# Patient Record
Sex: MALE | Race: WHITE | NOT HISPANIC OR LATINO | URBAN - METROPOLITAN AREA
[De-identification: names, ages, dates, MRNs, and addresses within clinical notes are randomized per-mention and may not be internally consistent; named-entity substitution may affect disease eponyms.]

---

## 2020-06-09 ENCOUNTER — OFFICE VISIT (OUTPATIENT)
Dept: URBAN - METROPOLITAN AREA CLINIC 77 | Facility: CLINIC | Age: 25
End: 2020-06-09
Payer: COMMERCIAL

## 2020-06-09 DIAGNOSIS — K50.80 CROHN'S DISEASE OF BOTH SMALL AND LARGE INTESTINE: ICD-10-CM

## 2020-06-09 DIAGNOSIS — K50.80 CROHN'S COLITIS: ICD-10-CM

## 2020-06-09 PROCEDURE — 96413 CHEMO IV INFUSION 1 HR: CPT | Performed by: INTERNAL MEDICINE

## 2020-06-09 RX ORDER — HYOSCYAMINE SULFATE 0.12 MG/1
TAKE 1 TABLET (0.125 MG) BY ORAL ROUTE EVERY 4 HOURS AS NEEDED FOR ABDOMINAL CRAMPS TABLET ORAL
Qty: 60 | Refills: 2 | Status: ACTIVE | COMMUNITY
Start: 2020-04-28 | End: 2020-07-27

## 2020-06-09 RX ORDER — USTEKINUMAB 90 MG/ML
INJECT 1 MILLILITER (90 MG) BY SUBCUTANEOUS ROUTE EVERY 8 WEEKS FOR 60 DAYS INJECTION, SOLUTION SUBCUTANEOUS
Qty: 1 | Refills: 6 | Status: ACTIVE | COMMUNITY
Start: 2020-05-26 | End: 2021-07-20

## 2020-08-04 ENCOUNTER — WEB ENCOUNTER (OUTPATIENT)
Dept: URBAN - METROPOLITAN AREA CLINIC 23 | Facility: CLINIC | Age: 25
End: 2020-08-04

## 2020-08-04 RX ORDER — USTEKINUMAB 90 MG/ML
INJECT 1 MILLILITER (90 MG) BY SUBCUTANEOUS ROUTE EVERY 6 WEEKS FOR 60 DAYS INJECTION, SOLUTION SUBCUTANEOUS
Qty: 2 | Refills: 6 | OUTPATIENT
Start: 2020-05-26 | End: 2021-07-20

## 2020-08-05 ENCOUNTER — WEB ENCOUNTER (OUTPATIENT)
Dept: URBAN - METROPOLITAN AREA CLINIC 23 | Facility: CLINIC | Age: 25
End: 2020-08-05

## 2020-08-20 ENCOUNTER — WEB ENCOUNTER (OUTPATIENT)
Dept: URBAN - METROPOLITAN AREA CLINIC 23 | Facility: CLINIC | Age: 25
End: 2020-08-20

## 2020-08-20 RX ORDER — CYANOCOBALAMIN 1000 UG/ML
1 ML INJECTION INTRAMUSCULAR; SUBCUTANEOUS
Qty: 0 | OUTPATIENT
Start: 2020-08-20 | End: 2020-09-19

## 2020-09-15 ENCOUNTER — OFFICE VISIT (OUTPATIENT)
Dept: URBAN - METROPOLITAN AREA CLINIC 23 | Facility: CLINIC | Age: 25
End: 2020-09-15
Payer: COMMERCIAL

## 2020-09-15 DIAGNOSIS — K50.00 ILEITIS, TERMINAL: ICD-10-CM

## 2020-09-15 DIAGNOSIS — K50.90 CROHN DISEASE: ICD-10-CM

## 2020-09-15 PROCEDURE — G9903 PT SCRN TBCO ID AS NON USER: HCPCS | Performed by: INTERNAL MEDICINE

## 2020-09-15 PROCEDURE — G8427 DOCREV CUR MEDS BY ELIG CLIN: HCPCS | Performed by: INTERNAL MEDICINE

## 2020-09-15 PROCEDURE — 99213 OFFICE O/P EST LOW 20 MIN: CPT | Performed by: INTERNAL MEDICINE

## 2020-09-15 PROCEDURE — G8420 CALC BMI NORM PARAMETERS: HCPCS | Performed by: INTERNAL MEDICINE

## 2020-09-15 RX ORDER — USTEKINUMAB 90 MG/ML
INJECT 1 MILLILITER (90 MG) BY SUBCUTANEOUS ROUTE EVERY 6 WEEKS FOR 60 DAYS INJECTION, SOLUTION SUBCUTANEOUS
Qty: 2 | Refills: 6 | Status: ACTIVE | COMMUNITY
Start: 2020-05-26 | End: 2021-07-20

## 2020-09-15 RX ORDER — CYANOCOBALAMIN 1000 UG/ML
1 ML INJECTION INTRAMUSCULAR; SUBCUTANEOUS
Qty: 0 | Status: ACTIVE | COMMUNITY
Start: 2020-08-20 | End: 2020-09-19

## 2020-09-15 NOTE — HPI-TODAY'S VISIT:
This year-old male presented for 2 month follow up after he started Stelara for newly diagnosed Crohn's disease. He had chronic abdominal pain diarrhea CT in May 2020 revealed terminal ileum inflammation colonoscopy confirmed diagnosis of Crohn disease, he was started on Stelara and fusion in June 2020 and he has 2 injections since then. He feels better his diarrhea and abdominal pain has improved

## 2020-09-15 NOTE — PHYSICAL EXAM CHEST:
no lesions,  no deformities,  no traumatic injuries,  no significant scars are present,  chest wall non-tender,  no masses present, breathing is unlabored without accessory muscle use, normal breath sounds DISPLAY PLAN FREE TEXT

## 2020-12-15 ENCOUNTER — WEB ENCOUNTER (OUTPATIENT)
Dept: URBAN - METROPOLITAN AREA CLINIC 23 | Facility: CLINIC | Age: 25
End: 2020-12-15

## 2020-12-15 ENCOUNTER — OFFICE VISIT (OUTPATIENT)
Dept: URBAN - METROPOLITAN AREA CLINIC 23 | Facility: CLINIC | Age: 25
End: 2020-12-15
Payer: COMMERCIAL

## 2020-12-15 DIAGNOSIS — K50.00 ILEITIS, TERMINAL: ICD-10-CM

## 2020-12-15 DIAGNOSIS — K50.90 CROHN DISEASE: ICD-10-CM

## 2020-12-15 PROCEDURE — G8427 DOCREV CUR MEDS BY ELIG CLIN: HCPCS | Performed by: INTERNAL MEDICINE

## 2020-12-15 PROCEDURE — G8482 FLU IMMUNIZE ORDER/ADMIN: HCPCS | Performed by: INTERNAL MEDICINE

## 2020-12-15 PROCEDURE — G9903 PT SCRN TBCO ID AS NON USER: HCPCS | Performed by: INTERNAL MEDICINE

## 2020-12-15 PROCEDURE — G8420 CALC BMI NORM PARAMETERS: HCPCS | Performed by: INTERNAL MEDICINE

## 2020-12-15 PROCEDURE — 99213 OFFICE O/P EST LOW 20 MIN: CPT | Performed by: INTERNAL MEDICINE

## 2020-12-15 RX ORDER — USTEKINUMAB 90 MG/ML
INJECT 1 MILLILITER (90 MG) BY SUBCUTANEOUS ROUTE EVERY 6 WEEKS FOR 60 DAYS INJECTION, SOLUTION SUBCUTANEOUS
Qty: 2 | Refills: 6 | Status: ACTIVE | COMMUNITY
Start: 2020-05-26 | End: 2021-07-20

## 2020-12-15 NOTE — HPI-TODAY'S VISIT:
This year-old male presented for 3 month follow up . he feels since last visit , he has 2 bowel movement a day , he is on stelra every 6 weeks  for  Crohn's disease. last visit I asked him to increased the frequency to every 6 weeks , He had chronic abdominal pain diarrhea CT in May 2020 revealed terminal ileum inflammation colonoscopy confirmed diagnosis of Crohn disease, his first infusion was in  June 2020 .  Since he increased the frequency of Stelara to every 6 weeks he feels much better last visit reported his pain and diarrhea worse during  last week before his injection, I increased the frequency to every 6 weeks and since then he feels much better

## 2021-06-15 ENCOUNTER — OFFICE VISIT (OUTPATIENT)
Dept: URBAN - METROPOLITAN AREA CLINIC 23 | Facility: CLINIC | Age: 26
End: 2021-06-15

## 2021-07-16 ENCOUNTER — TELEPHONE ENCOUNTER (OUTPATIENT)
Dept: URBAN - METROPOLITAN AREA CLINIC 23 | Facility: CLINIC | Age: 26
End: 2021-07-16

## 2021-07-16 RX ORDER — USTEKINUMAB 90 MG/ML
INJECT 1 MILLILITER (90 MG) BY SUBCUTANEOUS ROUTE EVERY 6 WEEKS FOR 60 DAYS INJECTION, SOLUTION SUBCUTANEOUS
Qty: 2 | Refills: 6
Start: 2020-05-26 | End: 2022-09-11

## 2021-08-27 ENCOUNTER — OFFICE VISIT (OUTPATIENT)
Dept: URBAN - METROPOLITAN AREA CLINIC 23 | Facility: CLINIC | Age: 26
End: 2021-08-27
Payer: COMMERCIAL

## 2021-08-27 DIAGNOSIS — K50.00 ILEITIS, TERMINAL: ICD-10-CM

## 2021-08-27 DIAGNOSIS — K50.90 CROHN DISEASE: ICD-10-CM

## 2021-08-27 PROCEDURE — 99214 OFFICE O/P EST MOD 30 MIN: CPT | Performed by: INTERNAL MEDICINE

## 2021-08-27 RX ORDER — USTEKINUMAB 90 MG/ML
INJECT 1 MILLILITER (90 MG) BY SUBCUTANEOUS ROUTE EVERY 6 WEEKS FOR 60 DAYS INJECTION, SOLUTION SUBCUTANEOUS
Qty: 2 | Refills: 6 | Status: ACTIVE | COMMUNITY
Start: 2020-05-26 | End: 2022-09-11

## 2021-08-27 NOTE — HPI-TODAY'S VISIT:
26 year-old male with history of small bowel Crohn's disease presented for 6 month follow up . His Stelara was changed to every 6 weeks since then his symptoms much better no abdominal pain no diarrhea, he has 2 bowel movement a day.   He had chronic abdominal pain diarrhea CT in May 2020 revealed terminal ileum inflammation colonoscopy confirmed diagnosis of Crohn disease, his first infusion was in  June 2020 .  Since he increased the frequency of Stelara to every 6 weeks he feels much better

## 2022-01-25 ENCOUNTER — TELEPHONE ENCOUNTER (OUTPATIENT)
Dept: URBAN - METROPOLITAN AREA CLINIC 23 | Facility: CLINIC | Age: 27
End: 2022-01-25

## 2022-01-25 RX ORDER — USTEKINUMAB 90 MG/ML
INJECT 1 PEN INJECTION, SOLUTION SUBCUTANEOUS
Qty: 2 PEN NEEDLE | Refills: 4
Start: 2022-01-25 | End: 2022-08-23

## 2022-02-18 ENCOUNTER — TELEPHONE ENCOUNTER (OUTPATIENT)
Dept: URBAN - METROPOLITAN AREA CLINIC 23 | Facility: CLINIC | Age: 27
End: 2022-02-18

## 2022-02-18 RX ORDER — USTEKINUMAB 90 MG/ML
ONE INJECTION INJECTION, SOLUTION SUBCUTANEOUS
Qty: 1 | Refills: 6 | OUTPATIENT
Start: 2022-02-20 | End: 2023-04-16

## 2022-02-22 ENCOUNTER — TELEPHONE ENCOUNTER (OUTPATIENT)
Dept: URBAN - METROPOLITAN AREA CLINIC 23 | Facility: CLINIC | Age: 27
End: 2022-02-22

## 2022-02-22 ENCOUNTER — OFFICE VISIT (OUTPATIENT)
Dept: URBAN - METROPOLITAN AREA CLINIC 23 | Facility: CLINIC | Age: 27
End: 2022-02-22

## 2022-02-22 RX ORDER — USTEKINUMAB 90 MG/ML
ONE INJECTION INJECTION, SOLUTION SUBCUTANEOUS
Qty: 1 | Refills: 11 | OUTPATIENT
Start: 2022-02-22 | End: 2023-08-16

## 2022-03-01 ENCOUNTER — OFFICE VISIT (OUTPATIENT)
Dept: URBAN - METROPOLITAN AREA CLINIC 23 | Facility: CLINIC | Age: 27
End: 2022-03-01
Payer: COMMERCIAL

## 2022-03-01 DIAGNOSIS — R19.7 DIARRHEA: ICD-10-CM

## 2022-03-01 DIAGNOSIS — K50.019 CROHN'S DISEASE OF SMALL INTESTINE WITH COMPLICATION: ICD-10-CM

## 2022-03-01 DIAGNOSIS — K50.00 ILEITIS, TERMINAL: ICD-10-CM

## 2022-03-01 PROCEDURE — 99214 OFFICE O/P EST MOD 30 MIN: CPT | Performed by: INTERNAL MEDICINE

## 2022-03-01 RX ORDER — BUDESONIDE 3 MG/1
TAKE 3 CAPSULE CAPSULE, COATED PELLETS ORAL
Qty: 105 | Refills: 2 | OUTPATIENT
Start: 2022-03-01

## 2022-03-01 RX ORDER — USTEKINUMAB 90 MG/ML
ONE INJECTION INJECTION, SOLUTION SUBCUTANEOUS
Qty: 1 | Refills: 6 | Status: ACTIVE | COMMUNITY
Start: 2022-02-20 | End: 2023-04-16

## 2022-03-01 NOTE — HPI-TODAY'S VISIT:
26 year-old male with history of small bowel Crohn's disease presented for 6 month follow up . He is here today for follow-up he his insurance did not approve his current dose of Stelara every 6 weeks, he missed last dose of injection for the last 3 weeks he started having abdominal discomfort diarrhea again.  He was on Stelara every 8 weeks which increase the dose to every 6 weeks and he felt better.  Today he has 2-3 bowel movement a day with abdominal cramping. .   He had chronic abdominal pain diarrhea CT in May 2020 revealed terminal ileum inflammation colonoscopy confirmed diagnosis of Crohn disease, his first infusion was in  June 2020 .  Since he increased the frequency of Stelara to every 6 weeks he feels much better

## 2022-03-26 ENCOUNTER — LAB OUTSIDE AN ENCOUNTER (OUTPATIENT)
Dept: URBAN - METROPOLITAN AREA CLINIC 23 | Facility: CLINIC | Age: 27
End: 2022-03-26

## 2022-04-02 LAB
A/G RATIO: 1.8
ALBUMIN: 4.7
ALKALINE PHOSPHATASE: 111
AST (SGOT): 14
BASO (ABSOLUTE): 0.1
BASOS: 1
BILIRUBIN, TOTAL: 0.5
BUN/CREATININE RATIO: 16
BUN: 14
C-REACTIVE PROTEIN, QUANT: 2
CALCIUM: 9.5
CARBON DIOXIDE, TOTAL: 20
CHLORIDE: 103
CREATININE: 0.88
EGFR: 120
EOS (ABSOLUTE): 0.4
EOS: 6
GLOBULIN, TOTAL: 2.6
GLUCOSE: 94
HEMATOCRIT: 48.8
HEMATOLOGY COMMENTS:: (no result)
HEMOGLOBIN: 16
IMMATURE CELLS: (no result)
IMMATURE GRANS (ABS): 0
IMMATURE GRANULOCYTES: 1
IRON BIND.CAP.(TIBC): 257
IRON SATURATION: 44
IRON: 112
LYMPHS (ABSOLUTE): 2.7
LYMPHS: 39
MCH: 29.9
MCHC: 32.8
MCV: 91
MONOCYTES(ABSOLUTE): 0.5
MONOCYTES: 8
NEUTROPHILS (ABSOLUTE): 3.2
NEUTROPHILS: 45
NRBC: (no result)
PLATELETS: 226
POTASSIUM: 4.5
PROTEIN, TOTAL: 7.3
QUANTIFERON CRITERIA: (no result)
QUANTIFERON INCUBATION: (no result)
QUANTIFERON MITOGEN VALUE: >10
QUANTIFERON NIL VALUE: 0.06
QUANTIFERON TB1 AG VALUE: 0.1
QUANTIFERON TB2 AG VALUE: 0.05
QUANTIFERON-TB GOLD PLUS: NEGATIVE
RBC: 5.35
RDW: 12.3
SODIUM: 140
UIBC: 145
VITAMIN B12: 643
VITAMIN D, 25-HYDROXY: 19.7
WBC: 6.9

## 2022-04-04 ENCOUNTER — WEB ENCOUNTER (OUTPATIENT)
Dept: URBAN - METROPOLITAN AREA CLINIC 23 | Facility: CLINIC | Age: 27
End: 2022-04-04

## 2022-04-21 ENCOUNTER — TELEPHONE ENCOUNTER (OUTPATIENT)
Dept: URBAN - METROPOLITAN AREA CLINIC 23 | Facility: CLINIC | Age: 27
End: 2022-04-21

## 2022-04-21 RX ORDER — USTEKINUMAB 90 MG/ML
ONE INJECTION INJECTION, SOLUTION SUBCUTANEOUS
Qty: 1 | Refills: 6
Start: 2022-02-20 | End: 2023-06-15

## 2022-05-06 ENCOUNTER — OFFICE VISIT (OUTPATIENT)
Dept: URBAN - METROPOLITAN AREA CLINIC 23 | Facility: CLINIC | Age: 27
End: 2022-05-06
Payer: COMMERCIAL

## 2022-05-06 DIAGNOSIS — K50.019 CROHN'S DISEASE OF SMALL INTESTINE WITH COMPLICATION: ICD-10-CM

## 2022-05-06 DIAGNOSIS — K50.00 ILEITIS, TERMINAL: ICD-10-CM

## 2022-05-06 DIAGNOSIS — R19.7 DIARRHEA: ICD-10-CM

## 2022-05-06 PROCEDURE — 99214 OFFICE O/P EST MOD 30 MIN: CPT | Performed by: INTERNAL MEDICINE

## 2022-05-06 RX ORDER — BUDESONIDE 3 MG/1
TAKE 3 CAPSULE CAPSULE, COATED PELLETS ORAL
Qty: 105 | Refills: 2 | Status: ACTIVE | COMMUNITY
Start: 2022-03-01

## 2022-05-06 RX ORDER — USTEKINUMAB 90 MG/ML
ONE INJECTION INJECTION, SOLUTION SUBCUTANEOUS
Qty: 1 | Refills: 6 | Status: ACTIVE | COMMUNITY
Start: 2022-02-20 | End: 2023-06-15

## 2022-05-06 NOTE — HPI-TODAY'S VISIT:
27 year-old male with history of small bowel Crohn's disease presented for 2 month follow up . Last visit that he had low lab which showed his vitamin D was low he started on supplement he is currently on Stelara every 6 weeks his insurance just approved it.  He missed couple of doses because of insurance issue.  I gave him short course of budesonide while he was off Stelara.  No recurrent flare his abdominal pain diarrhea has improved.   .  He was on Stelara every 8 weeks which increase the dose to every 6 weeks and he felt better.  Today he has 2-3 bowel movement a day with abdominal cramping. .   He had chronic abdominal pain diarrhea CT in May 2020 revealed terminal ileum inflammation colonoscopy confirmed diagnosis of Crohn disease, his first infusion was in  June 2020 .  Since he increased the frequency of Stelara to every 6 weeks he feels much better

## 2022-07-18 ENCOUNTER — TELEPHONE ENCOUNTER (OUTPATIENT)
Dept: URBAN - METROPOLITAN AREA CLINIC 23 | Facility: CLINIC | Age: 27
End: 2022-07-18

## 2022-07-18 RX ORDER — USTEKINUMAB 90 MG/ML
1 INJECTION INJECTION, SOLUTION SUBCUTANEOUS
Qty: 1 PEN NEEDLE | Refills: 11
Start: 2022-07-18

## 2022-09-02 ENCOUNTER — OFFICE VISIT (OUTPATIENT)
Dept: URBAN - METROPOLITAN AREA CLINIC 23 | Facility: CLINIC | Age: 27
End: 2022-09-02

## 2022-11-15 ENCOUNTER — OFFICE VISIT (OUTPATIENT)
Dept: URBAN - METROPOLITAN AREA CLINIC 23 | Facility: CLINIC | Age: 27
End: 2022-11-15
Payer: COMMERCIAL

## 2022-11-15 ENCOUNTER — DASHBOARD ENCOUNTERS (OUTPATIENT)
Age: 27
End: 2022-11-15

## 2022-11-15 ENCOUNTER — WEB ENCOUNTER (OUTPATIENT)
Dept: URBAN - METROPOLITAN AREA CLINIC 23 | Facility: CLINIC | Age: 27
End: 2022-11-15

## 2022-11-15 ENCOUNTER — LAB OUTSIDE AN ENCOUNTER (OUTPATIENT)
Dept: URBAN - METROPOLITAN AREA CLINIC 23 | Facility: CLINIC | Age: 27
End: 2022-11-15

## 2022-11-15 VITALS
TEMPERATURE: 97 F | HEART RATE: 88 BPM | HEIGHT: 68 IN | BODY MASS INDEX: 22.73 KG/M2 | DIASTOLIC BLOOD PRESSURE: 81 MMHG | SYSTOLIC BLOOD PRESSURE: 124 MMHG | WEIGHT: 150 LBS

## 2022-11-15 DIAGNOSIS — K92.1 HEMATOCHEZIA: ICD-10-CM

## 2022-11-15 DIAGNOSIS — K50.00 ILEITIS, TERMINAL: ICD-10-CM

## 2022-11-15 DIAGNOSIS — K50.019 CROHN'S DISEASE OF SMALL INTESTINE WITH COMPLICATION: ICD-10-CM

## 2022-11-15 DIAGNOSIS — R19.7 DIARRHEA: ICD-10-CM

## 2022-11-15 PROCEDURE — 99214 OFFICE O/P EST MOD 30 MIN: CPT | Performed by: INTERNAL MEDICINE

## 2022-11-15 RX ORDER — USTEKINUMAB 90 MG/ML
1 INJECTION INJECTION, SOLUTION SUBCUTANEOUS
Qty: 1 PEN NEEDLE | Refills: 11 | Status: ACTIVE | COMMUNITY
Start: 2022-07-18

## 2022-11-15 RX ORDER — BUDESONIDE 3 MG/1
TAKE 3 CAPSULE CAPSULE, COATED PELLETS ORAL
Qty: 105 | Refills: 2 | Status: ACTIVE | COMMUNITY
Start: 2022-03-01

## 2022-11-15 NOTE — HPI-TODAY'S VISIT:
27 year-old male with history of small bowel Crohn's disease presented for  follow up .  About 2 weeks ago he had episode of hematochezia with bright red blood per rectum after bowel movement he reported the bleeding was significant associated with increased diarrhea.  He had lab 4 weeks ago at his primary care showed normal CBC he is concerned because increased frequency of abdominal cramping diarrhea and blood in the stool.    .  He was on Stelara every  every 6 weeks and he felt better.  Today he has 2-3 bowel movement a day with abdominal cramping. .   He had chronic abdominal pain diarrhea CT in May 2020 revealed terminal ileum inflammation colonoscopy confirmed diagnosis of Crohn disease, his first infusion was in  June 2020 .

## 2022-11-16 PROBLEM — 34000006 CROHN DISEASE: Status: ACTIVE | Noted: 2022-03-01

## 2022-11-16 PROBLEM — 56689002 CROHN'S DISEASE OF SMALL INTESTINE: Status: ACTIVE | Noted: 2022-03-01

## 2022-11-16 PROBLEM — 56689002: Status: ACTIVE | Noted: 2022-03-03

## 2022-12-23 ENCOUNTER — OFFICE VISIT (OUTPATIENT)
Dept: URBAN - METROPOLITAN AREA SURGERY CENTER 8 | Facility: SURGERY CENTER | Age: 27
End: 2022-12-23

## 2022-12-23 ENCOUNTER — CLAIMS CREATED FROM THE CLAIM WINDOW (OUTPATIENT)
Dept: URBAN - METROPOLITAN AREA SURGERY CENTER 8 | Facility: SURGERY CENTER | Age: 27
End: 2022-12-23
Payer: COMMERCIAL

## 2022-12-23 DIAGNOSIS — K63.89 BACTERIAL OVERGROWTH SYNDROME: ICD-10-CM

## 2022-12-23 DIAGNOSIS — K50.90 CROHN DISEASE: ICD-10-CM

## 2022-12-23 PROCEDURE — G8907 PT DOC NO EVENTS ON DISCHARG: HCPCS | Performed by: INTERNAL MEDICINE

## 2022-12-23 PROCEDURE — 45380 COLONOSCOPY AND BIOPSY: CPT | Performed by: INTERNAL MEDICINE
